# Patient Record
Sex: MALE | HISPANIC OR LATINO | URBAN - METROPOLITAN AREA
[De-identification: names, ages, dates, MRNs, and addresses within clinical notes are randomized per-mention and may not be internally consistent; named-entity substitution may affect disease eponyms.]

---

## 2022-12-02 ENCOUNTER — OFFICE VISIT (OUTPATIENT)
Dept: URBAN - METROPOLITAN AREA CLINIC 63 | Facility: CLINIC | Age: 22
End: 2022-12-02
Payer: COMMERCIAL

## 2022-12-02 ENCOUNTER — WEB ENCOUNTER (OUTPATIENT)
Dept: URBAN - METROPOLITAN AREA CLINIC 63 | Facility: CLINIC | Age: 22
End: 2022-12-02

## 2022-12-02 ENCOUNTER — LAB OUTSIDE AN ENCOUNTER (OUTPATIENT)
Dept: URBAN - METROPOLITAN AREA CLINIC 63 | Facility: CLINIC | Age: 22
End: 2022-12-02

## 2022-12-02 VITALS
DIASTOLIC BLOOD PRESSURE: 74 MMHG | SYSTOLIC BLOOD PRESSURE: 122 MMHG | TEMPERATURE: 98.2 F | HEART RATE: 73 BPM | WEIGHT: 171.6 LBS | OXYGEN SATURATION: 98 % | HEIGHT: 72 IN | BODY MASS INDEX: 23.24 KG/M2

## 2022-12-02 DIAGNOSIS — R10.10 UPPER ABDOMINAL PAIN: ICD-10-CM

## 2022-12-02 DIAGNOSIS — K21.9 GASTROESOPHAGEAL REFLUX DISEASE WITHOUT ESOPHAGITIS: ICD-10-CM

## 2022-12-02 DIAGNOSIS — K76.0 FATTY LIVER: ICD-10-CM

## 2022-12-02 PROCEDURE — 99203 OFFICE O/P NEW LOW 30 MIN: CPT | Performed by: PHYSICIAN ASSISTANT

## 2022-12-02 RX ORDER — PANTOPRAZOLE SODIUM 40 MG/1
TAKE 1 TABLET BY MOUTH DAILY TABLET, DELAYED RELEASE ORAL
Qty: 30 EACH | Refills: 0 | Status: ACTIVE | COMMUNITY

## 2022-12-02 NOTE — HPI-TODAY'S VISIT:
Elisa is a pleasant 22-year-old male who presents today for evaluation of abdominal pain.  Noted to his PCP right upper quadrant pain worse with fatty meals.  Epigastric pain with heartburn.  Started on pantoprazole 40 mg daily.  Upper GI series ordered.  H. pylori urea breath test ordered.  H. pylori urea breath test dated 9/16/2022 not detected  Upper GI series dated 9/9/2022 showed spontaneous gastroesophageal reflux occured but the patient was asymptomatic. Poor coating of the stomach is consistent with increased gastric secretions  Patient reported to the ER on 6/5/2022 with complaints of epigastric pain.  Pain occurring at night and in the mornings.  Normal CBC.  Normal CMP.  Normal lipase.  H. pylori stool antigen positive.  Ultrasound dated 7/22/2022 showed mild hepatic steatosis.  No cholelithiasis or biliary dilatation.  Over the last several months notes intermittent epigastric pain, waking him up in the morning. EGD naive. Treated for H. pylori back in June but symptoms persistent. Currently taking pantoprazole 40 mg PRN. Intermittent reflux. When he does take it he notes relief. Notes 1-2 daily bowel movements without constipation or diarrhea. Denies nausea, vomiting, dysphagia, odynophagia, hematemesis, coffee ground emesis, change in bowel habits, abnormal weight loss, BRBPR or melena. Denies family history of colon cancer or colon polyps. No other GI complaints at this time

## 2022-12-09 ENCOUNTER — LAB OUTSIDE AN ENCOUNTER (OUTPATIENT)
Dept: URBAN - METROPOLITAN AREA CLINIC 63 | Facility: CLINIC | Age: 22
End: 2022-12-09

## 2022-12-23 ENCOUNTER — OFFICE VISIT (OUTPATIENT)
Dept: URBAN - METROPOLITAN AREA SURGERY CENTER 4 | Facility: SURGERY CENTER | Age: 22
End: 2022-12-23
Payer: COMMERCIAL

## 2022-12-23 DIAGNOSIS — R10.13 EPIGASTRIC PAIN: ICD-10-CM

## 2022-12-23 DIAGNOSIS — K29.70 GASTRITIS: ICD-10-CM

## 2022-12-23 PROCEDURE — 43239 EGD BIOPSY SINGLE/MULTIPLE: CPT | Performed by: INTERNAL MEDICINE

## 2022-12-23 RX ORDER — PANTOPRAZOLE SODIUM 40 MG/1
TAKE 1 TABLET BY MOUTH DAILY TABLET, DELAYED RELEASE ORAL
Qty: 30 EACH | Refills: 0 | Status: ACTIVE | COMMUNITY

## 2022-12-26 PROBLEM — 4556007 GASTRITIS: Status: ACTIVE | Noted: 2022-12-26

## 2022-12-31 LAB
A/G RATIO: 2.1
ABSOLUTE BASOPHILS: 20
ABSOLUTE EOSINOPHILS: 184
ABSOLUTE LYMPHOCYTES: 3026
ABSOLUTE MONOCYTES: 422
ABSOLUTE NEUTROPHILS: 3148
ALBUMIN: 4.4
ALKALINE PHOSPHATASE: 70
ALT (SGPT): 16
AST (SGOT): 15
BASOPHILS: 0.3
BILIRUBIN, TOTAL: 0.6
BUN/CREATININE RATIO: (no result)
BUN: 14
CALCIUM: 9.5
CARBON DIOXIDE, TOTAL: 30
CHLORIDE: 103
CREATININE: 0.96
EGFR: 113
EOSINOPHILS: 2.7
GLOBULIN, TOTAL: 2.1
GLUCOSE: 80
HEMATOCRIT: 48.1
HEMOGLOBIN: 16.1
HEP A AB, IGM: (no result)
HEP B CORE AB, IGM: (no result)
HEPATITIS A AB, TOTAL: REACTIVE
HEPATITIS B CORE AB TOTAL: (no result)
HEPATITIS B SURFACE AB IMMUNITY, QN: <5
HEPATITIS B SURFACE ANTIGEN: (no result)
HEPATITIS C ANTIBODY: (no result)
INR: 1
LYMPHOCYTES: 44.5
MCH: 28.9
MCHC: 33.5
MCV: 86.4
MONOCYTES: 6.2
MPV: 9.9
NEUTROPHILS: 46.3
PLATELET COUNT: 253
POTASSIUM: 4.4
PROTEIN, TOTAL: 6.5
PT: 10.2
RDW: 12.2
RED BLOOD CELL COUNT: 5.57
SIGNAL TO CUT-OFF: <0.02
SODIUM: 140
WHITE BLOOD CELL COUNT: 6.8

## 2023-01-13 ENCOUNTER — DASHBOARD ENCOUNTERS (OUTPATIENT)
Age: 23
End: 2023-01-13

## 2023-01-13 ENCOUNTER — OFFICE VISIT (OUTPATIENT)
Dept: URBAN - METROPOLITAN AREA CLINIC 63 | Facility: CLINIC | Age: 23
End: 2023-01-13
Payer: COMMERCIAL

## 2023-01-13 VITALS
HEIGHT: 72 IN | DIASTOLIC BLOOD PRESSURE: 80 MMHG | WEIGHT: 173.6 LBS | SYSTOLIC BLOOD PRESSURE: 120 MMHG | HEART RATE: 83 BPM | BODY MASS INDEX: 23.51 KG/M2 | OXYGEN SATURATION: 99 % | TEMPERATURE: 97.2 F

## 2023-01-13 DIAGNOSIS — R10.10 UPPER ABDOMINAL PAIN: ICD-10-CM

## 2023-01-13 DIAGNOSIS — K21.9 GASTROESOPHAGEAL REFLUX DISEASE WITHOUT ESOPHAGITIS: ICD-10-CM

## 2023-01-13 DIAGNOSIS — K76.0 FATTY LIVER: ICD-10-CM

## 2023-01-13 PROBLEM — 266435005: Status: ACTIVE | Noted: 2022-12-01

## 2023-01-13 PROBLEM — 197321007: Status: ACTIVE | Noted: 2022-12-01

## 2023-01-13 PROCEDURE — 99213 OFFICE O/P EST LOW 20 MIN: CPT | Performed by: PHYSICIAN ASSISTANT

## 2023-01-13 RX ORDER — PANTOPRAZOLE SODIUM 20 MG/1
1 TABLET 30 MINUTES BEFORE MEAL TABLET, DELAYED RELEASE ORAL ONCE A DAY
Qty: 90 TABLET | Refills: 1 | OUTPATIENT

## 2023-01-13 RX ORDER — PANTOPRAZOLE SODIUM 40 MG/1
TAKE 1 TABLET BY MOUTH DAILY TABLET, DELAYED RELEASE ORAL
Qty: 30 EACH | Refills: 0 | Status: ACTIVE | COMMUNITY

## 2023-01-13 NOTE — HPI-TODAY'S VISIT:
Elisa is a pleasant 22-year-old male who presents today for a procedure follow up. Seen last visit in evaluation of abdominal pain.  Noted to his PCP right upper quadrant pain worse with fatty meals.  Epigastric pain with heartburn.  Started on pantoprazole 40 mg daily.    EGD dated 12/23/2022 showed a regular Z-line.  Gastritis.  Normal duodenum. Small bowel mucosa with no evidence of sprue or villous atrophy.  Body and antral type gastric mucosa negative for H. pylori.  GE junction biopsies revealed chronic cardio esophagitis consistent with gastrointestinal reflux disease  FibroScan dated 12/17/2022 showed S0 and F0  Labs dated 12/30/2022 showed no immunity to hep B.  Normal CMP.  Normal CBC.  PT/INR normal.  Hep A antibody total reactive but remaining hepatitis panel negative.  H. pylori urea breath test dated 9/16/2022 not detected  Upper GI series dated 9/9/2022 showed spontaneous gastroesophageal reflux occured but the patient was asymptomatic. Poor coating of the stomach is consistent with increased gastric secretions  Labs dated 6/5/22 demonstrated a normal CBC.  Normal CMP.  Normal lipase.  H. pylori stool antigen positive.  Ultrasound dated 7/22/2022 showed mild hepatic steatosis.  No cholelithiasis or biliary dilatation.  No issues after procedure. PIPIDA scan ordered last visit not completed. Currently taking pantoprazole 40 mg qd. Abdominal pain subsided. Notes 1-2 daily bowel movements without constipation or diarrhea. Denies nausea, vomiting, heartburn, reflux, dysphagia, odynophagia, hematemesis, coffee ground emesis, change in bowel habits, abnormal weight loss, BRBPR or melena. Denies family history of colon cancer or colon polyps. No other GI complaints at this time

## 2023-03-03 ENCOUNTER — OFFICE VISIT (OUTPATIENT)
Dept: URBAN - METROPOLITAN AREA CLINIC 63 | Facility: CLINIC | Age: 23
End: 2023-03-03

## 2023-03-03 RX ORDER — PANTOPRAZOLE SODIUM 40 MG/1
TAKE 1 TABLET BY MOUTH DAILY TABLET, DELAYED RELEASE ORAL
Qty: 30 EACH | Refills: 0 | Status: ACTIVE | COMMUNITY

## 2023-03-03 RX ORDER — PANTOPRAZOLE SODIUM 20 MG/1
1 TABLET 30 MINUTES BEFORE MEAL TABLET, DELAYED RELEASE ORAL ONCE A DAY
Qty: 90 TABLET | Refills: 1 | Status: ACTIVE | COMMUNITY

## 2024-01-22 ENCOUNTER — TELEPHONE ENCOUNTER (OUTPATIENT)
Dept: URBAN - METROPOLITAN AREA CLINIC 63 | Facility: CLINIC | Age: 24
End: 2024-01-22